# Patient Record
Sex: MALE | Race: ASIAN | Employment: STUDENT | URBAN - METROPOLITAN AREA
[De-identification: names, ages, dates, MRNs, and addresses within clinical notes are randomized per-mention and may not be internally consistent; named-entity substitution may affect disease eponyms.]

---

## 2019-07-12 ENCOUNTER — HOSPITAL ENCOUNTER (EMERGENCY)
Facility: CLINIC | Age: 24
Discharge: HOME | End: 2019-07-12
Attending: EMERGENCY MEDICINE | Admitting: EMERGENCY MEDICINE
Payer: COMMERCIAL

## 2019-07-12 VITALS
OXYGEN SATURATION: 98 % | BODY MASS INDEX: 24.06 KG/M2 | WEIGHT: 153.3 LBS | TEMPERATURE: 98.8 F | DIASTOLIC BLOOD PRESSURE: 82 MMHG | SYSTOLIC BLOOD PRESSURE: 140 MMHG | HEIGHT: 67 IN | RESPIRATION RATE: 16 BRPM

## 2019-07-12 DIAGNOSIS — R07.9 CHEST PAIN, UNSPECIFIED TYPE: ICD-10-CM

## 2019-07-12 LAB — INTERPRETATION ECG - MUSE: NORMAL

## 2019-07-12 PROCEDURE — 99283 EMERGENCY DEPT VISIT LOW MDM: CPT | Mod: 25 | Performed by: EMERGENCY MEDICINE

## 2019-07-12 PROCEDURE — 93005 ELECTROCARDIOGRAM TRACING: CPT | Performed by: EMERGENCY MEDICINE

## 2019-07-12 PROCEDURE — 99283 EMERGENCY DEPT VISIT LOW MDM: CPT | Performed by: EMERGENCY MEDICINE

## 2019-07-12 PROCEDURE — 93010 ELECTROCARDIOGRAM REPORT: CPT | Mod: Z6 | Performed by: EMERGENCY MEDICINE

## 2019-07-12 ASSESSMENT — ENCOUNTER SYMPTOMS
FEVER: 0
WEAKNESS: 0
EYE DISCHARGE: 0
SHORTNESS OF BREATH: 0
BACK PAIN: 0
CONFUSION: 0
SORE THROAT: 0
COUGH: 0
DYSURIA: 0
FLANK PAIN: 0
CHILLS: 0
DIARRHEA: 0
MYALGIAS: 0
VOMITING: 0
HEADACHES: 0
ABDOMINAL PAIN: 0
RHINORRHEA: 0
NAUSEA: 0
BRUISES/BLEEDS EASILY: 0

## 2019-07-12 ASSESSMENT — MIFFLIN-ST. JEOR: SCORE: 1647.86

## 2019-07-12 NOTE — ED NOTES
Pt c/o L chest/shoulder pain intermittently. Pt states sometimes its painful and sometimes it's just tingling. He has not had an episdode today.

## 2019-07-12 NOTE — ED AVS SNAPSHOT
[facility], [place], Emergency Department  [address] [place] [id number]  Phone:  [phone]                                    Shannan Bai   MRN: [id number]    Department:  [facility], Emergency Department   Date of Visit:  7/12/2019           After Visit Summary Signature Page    I have received my discharge instructions, and my questions have been answered. I have discussed any challenges I see with this plan with the nurse or doctor.    ..........................................................................................................................................  Patient/Patient Representative Signature      ..........................................................................................................................................  Patient Representative Print Name and Relationship to Patient    ..................................................               ................................................  Date                                   Time    ..........................................................................................................................................  Reviewed by Signature/Title    ...................................................              ..............................................  Date                                               Time          22EPIC Rev 08/18

## 2019-07-12 NOTE — ED PROVIDER NOTES
History     Chief Complaint   Patient presents with     Chest Wall Pain     HPI  Shannan Bai is a 23 year old male who has no significant past medical history who presents emergency department from home with a complaint of some chest pain.  Patient complains of intermittent chest wall and left shoulder pain over the past few days.  Patient describes the pain is a dull achy pain that he describes as a minimal pain with no radiation, no aggravating, no alleviating factors.  Patient also complains of an occasional tingling or numbness in his left arm.  Patient denies any fever, chills, nausea, vomiting, shortness of breath, cough or cold-like symptoms.  Patient reports that he is extremely healthy, exercises regularly, has no symptoms when he runs, he does report that he has been lifting more and lifting more weights in his upper extremity so thinks the pain may be related to this however he just wanted to make sure it was not his heart.  Patient denies any leg pain, calf tenderness, ankle swelling.  Patient denies any recent immobilization, prolonged car rides, travel, no cancer history, patient denies any tobacco, drug, alcohol abuse (reports he does smoke approximately 5 cigarettes/year)    I have reviewed the Medications, Allergies, Past Medical and Surgical History, and Social History in the Epic system.    Review of Systems   Constitutional: Negative for chills and fever.   HENT: Negative for congestion, rhinorrhea and sore throat.    Eyes: Negative for discharge.   Respiratory: Negative for cough and shortness of breath.    Cardiovascular: Positive for chest pain. Negative for leg swelling.   Gastrointestinal: Negative for abdominal pain, diarrhea, nausea and vomiting.   Endocrine: Negative for polyuria.   Genitourinary: Negative for dysuria and flank pain.   Musculoskeletal: Negative for back pain and myalgias.   Skin: Negative for rash.   Allergic/Immunologic: Negative for immunocompromised state.  "  Neurological: Negative for weakness and headaches.   Hematological: Does not bruise/bleed easily.   Psychiatric/Behavioral: Negative for confusion and suicidal ideas.       Physical Exam   BP: 144/89  Heart Rate: 115  Temp: 98.8  F (37.1  C)  Resp: 18  Height: 170 cm (5' 6.93\")  Weight: 69.5 kg (153 lb 4.8 oz)  SpO2: 100 %      Physical Exam   Constitutional: He is oriented to person, place, and time. He appears well-developed. No distress.   HENT:   Head: Normocephalic and atraumatic.   Right Ear: External ear normal.   Left Ear: External ear normal.   Mouth/Throat: Oropharynx is clear and moist.   Eyes: Pupils are equal, round, and reactive to light. Conjunctivae and EOM are normal.   Neck: Normal range of motion. Neck supple.   Cardiovascular: Normal rate, regular rhythm and normal heart sounds.   Pulmonary/Chest: Effort normal and breath sounds normal. No respiratory distress. He has no wheezes. He has no rales.   Abdominal: Soft. He exhibits no distension. There is no tenderness. There is no rebound and no guarding.   Musculoskeletal: Normal range of motion. He exhibits no tenderness or deformity.   Neurological: He is alert and oriented to person, place, and time. No cranial nerve deficit. Coordination normal.   Skin: Skin is warm and dry. No rash noted.   Psychiatric: He has a normal mood and affect. His behavior is normal.       ED Course        Procedures             EKG Interpretation:      Interpreted by Akua Hawk  Time reviewed: 0024  Symptoms at time of EKG: chest pain   Rhythm: normal sinus rhythm with sinus arrhythmia   Rate: normal  Axis: normal  Ectopy: none  Conduction: normal  ST Segments/ T Waves: No ischemic change, early repolarization  Q Waves: none  Comparison to prior: No old EKG available    Clinical Impression: normal EKG, early repolarization, no acute ischemic change      Labs Ordered and Resulted from Time of ED Arrival Up to the Time of Departure from the ED - No data to " display      Assessments & Plan (with Medical Decision Making)     Shannan Bai is a 23 year old male who has no significant past medical history who presents emergency department from home with a complaint of some chest pain.  Upon arrival patient is well-appearing, afebrile, no distress.  Patient with mild tachycardic initially however was anxious.  Otherwise vital signs within normal limits and hemodynamically stable.  Unremarkable physical examination.  I do not believe patient's complaint is cardiac etiology, discussed with the patient this could be inflammatory related to his increase his weights and exercises.  At this time will obtain an EKG.  Patient is overall extremely low risk for any cardiac, pulmonary, infectious etiology. No exertional symptoms. PERC and Wells negative. No risk factors.     I reviewed EKG which with marked sinus arrhythmia with a ventricular rate of 68 bpm with evidence of early repolarization, no acute ischemic change.  I I discussed results with patient and overall risk factors.  The clinical presentation along with patient risk factors makes him extremely low risk for any cardiac etiology. The History, physical exam, were reviewed with the patient and is not consistent with coronary disease or evolving myocardial infarction.  Patient feels comfortable with discharge home with close monitoring and close follow-up with his primary care physician however return precautions discussed if recurrent or persistent chest pain, shortness of breath, or worsening symptoms. Patient understands and agrees with plan to just return precautions discussed.    I have reviewed the nursing notes.    I have reviewed the findings, diagnosis, plan and need for follow up with the patient.       Medication List      There are no discharge medications for this visit.         Final diagnoses:   Chest pain, unspecified type       7/12/2019   [facility], [place], EMERGENCY DEPARTMENT     Akua Hawk,  MD  07/12/19 0230

## 2019-07-12 NOTE — DISCHARGE INSTRUCTIONS
Thank you for your patience today.  Please follow-up with your regular doctor in the next 2-3 days for further evaluation and follow-up care.  Please call to schedule an appointment.  Please continue your own medications.  Please return to the ER if you develop worsening or persistent chest pain, shortness of breath, weakness, or any worsening of your current symptoms.  It was a pleasure taking care of you today.  We hope you feel better soon.

## 2020-02-07 ENCOUNTER — MEDICAL CORRESPONDENCE (OUTPATIENT)
Dept: HEALTH INFORMATION MANAGEMENT | Facility: CLINIC | Age: 25
End: 2020-02-07

## 2020-02-07 ENCOUNTER — TRANSFERRED RECORDS (OUTPATIENT)
Dept: HEALTH INFORMATION MANAGEMENT | Facility: CLINIC | Age: 25
End: 2020-02-07

## 2020-02-07 NOTE — PROGRESS NOTES
[facility] Neurology Consultation    Shannan Bai MRN# [id number]   Age: 24 year old YOB: 1995     Requesting physician: Yury Tinajero  [facility] [place]     Reason for Consultation: atypical motions of the right side mouth, right 4th and 5th digits, and right foot    History of Presenting Symptoms:  Shannan Bai is a 24 year old male who presents today for evaluation of right side mouth, right 4th and 5th digits, and right foot.    The patient reports that in 2017, (6-12 times) he gets coiling (finger flexion) up of his 4th, 5th digits on his right hand.  He also gets some weakness in his right leg (leading to weakness with walking).  He has never fallen when he reports the weakness.  He also has an issue with his right side of the mouth where sometimes it feels like it may be drooping or being pulled toward his ear. All the symptoms can occur independently all together.  There is no way to reproduce them on command.  They last 10-15 seconds, and he cant control them when they are preent.  They are not painful and do not feel like a cramp.  He doesn't know if they occur in sleep.  There is no visual change, headache, nausea/vomiting, head turning.  There is no choking, no speech difficulties.  He is not reporting confusion prior to or after the events, having an atypical smell or movement or tic in language prior, and no rising sensation or nausea during.    No brain trauma, no childhood infections.  No odd exposures to chemicals, no increased stress.  He was doing his undergrad in 2018, and he is here for graduate studies.     The patient had a large amount of tests done while home in [facility] out of concern for episodes of slight weakness of the right leg, right arm and right facial droop.  The patient was seen at [facility] for a clinic visit on 2/10/2020 and indicated that these episodes would occur every 2-3 months with resolution on their own.  He mentioned being very concerned about  pseudostrokes.      Past Medical History:   Mitral regurgtation     Past Surgical History:     Past Surgical History:   Procedure Laterality Date     COLONOSCOPY          Social History:   Smokes for 2 months, but stopped. Drinks socially. No illicit drugs. 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   Tobacco Use     Smoking status: Never Smoker     Smokeless tobacco: Never Used   Substance Use Topics     Alcohol use: Yes     Comment: 5 drinks every month     Drug use: Never        Family History:   No major family history.     Medications:   No current medications     Allergies:   Allergies no known allergies     Review of Systems:   A comprehensive 10 point review of systems (constitutional, ENT, cardiac, peripheral vascular, lymphatic, respiratory, GI, , Musculoskeletal, skin, Neurological) was performed and found to be negative except as described in this note.      Physical Exam:   Vitals: /68 (BP Location: Right arm, Patient Position: Sitting)   Pulse 62   Wt 62.6 kg (138 lb)   SpO2 99%   BMI 21.66 kg/m    General: Seated comfortably in no acute distress.  HEENT: Neck supple with normal range of motion. No paracervical muscle tenderness or tightness.  Optic discs sharp and vasculature normal on funduscopic exam.   Heart: Regular rate  Lungs: breathing comfortably  GI: Non tender  Extremities: no edema  Skin: No rashes  Neurologic:     Mental Status: Fully alert, attentive and oriented. Speech clear and fluent, no paraphasic errors.      Cranial Nerves: Visual fields intact. PERRL. EOMI with normal smooth pursuit. Facial sensation intact/symmetric. Facial movements symmetric. Hearing not formally tested but intact to conversation. Palate elevation symmetric, uvula midline. No dysarthria. Shoulder shrug strong bilaterally. Tongue protrusion midline.     Motor: No tremors or other abnormal movements observed. Muscle tone normal throughout. No pronator drift.  Normal/symmetric rapid finger tapping. Strength 5/5 throughout upper and lower extremities.     Deep Tendon Reflexes: 2+/symmetric throughout upper and lower extremities. No clonus. Toes downgoing bilaterally.     Sensory: Intact/symmetric to light touch, pinprick, temperature, vibration and proprioception throughout upper and lower extremities. Negative Romberg.      Coordination: Finger-nose-finger and heel-shin intact without dysmetria. Rapid alternating movements intact/symmetric with normal speed and rhythm.     Gait: Normal, steady casual gait. Able to walk on toes, heels and tandem without difficulty.         Assessment and Plan:   Assessment:  - Involuntary contractions of hand, face, and foot    The patient has an entirely normal exam, but his history of symptoms, onset of symptoms, and location of symptoms could represent a seizure or some lesion leading to seizure that would be found on the primary motor cortex or basal ganglia.  There may be autoimmune causes, but as to why there would only be one pattern of onset that is stereotypical and positive in symptomatolgy.  There is also a possibility of a dystonia syndrome (dopa-responsive), but at this young of an age, without pain, and without any progression of symptoms over 2 years, I will r/out more severe conditions before moving the w/up in this direction.     Plan:  - EEG ( 3 hour, sleep deprived )  - Mri brain w/wout contrast      Follow up in Neurology clinic as needed should new concerns arise.    PHYLICIA Pascal D.O.   of Neurology      Greater than 50% of the total time (40 min) in this patient encounter was spent on counseling and/or coordination of care. We reviewed diagnostic results, impressions, and discussed other possible tests if symptoms do not improve. We discussed the implications of the diagnosis, as well as risks and benefits of management options. We reviewed treatment instructions and our scheduled follow-up as  specified in the discharge plan. We also discussed the importance of compliance with the chosen course of treatment. The patient is in agreement with this plan and has no further questions.

## 2020-02-09 NOTE — TELEPHONE ENCOUNTER
RECORDS RECEIVED FROM: External   Date of Appt: 2.10.2020   NOTES (FOR ALL VISITS) STATUS DETAILS   OFFICE NOTE from referring provider In process    OFFICE NOTE from other specialist N/A    DISCHARGE SUMMARY from hospital N/A    DISCHARGE REPORT from the ER N/A    OPERATIVE REPORT N/A    MEDICATION LIST N/A    IMAGING  (FOR ALL VISITS)     EMG N/A    EEG N/A    MRI (HEAD, NECK, SPINE) N/A    LUMBAR PUNCTURE N/A    LIV Scan N/A    CT (HEAD, NECK, SPINE) N/A       Action 2.9.2020 MJ 1:31 PM   Action Taken Send urgent request to [facility] for all related med recs and imaging.     2/10/2020-Spoke with [facility], Will fax records ASAP-MR @ 626AM   Records from [facility] received and scanned to [facility]-MR @ 910AM

## 2020-02-10 ENCOUNTER — OFFICE VISIT (OUTPATIENT)
Dept: NEUROLOGY | Facility: CLINIC | Age: 25
End: 2020-02-10
Payer: COMMERCIAL

## 2020-02-10 ENCOUNTER — PRE VISIT (OUTPATIENT)
Dept: NEUROLOGY | Facility: CLINIC | Age: 25
End: 2020-02-10

## 2020-02-10 VITALS
WEIGHT: 138 LBS | DIASTOLIC BLOOD PRESSURE: 68 MMHG | BODY MASS INDEX: 21.66 KG/M2 | HEART RATE: 62 BPM | SYSTOLIC BLOOD PRESSURE: 139 MMHG | OXYGEN SATURATION: 99 %

## 2020-02-10 DIAGNOSIS — R25.3 MUSCLE TWITCHING: ICD-10-CM

## 2020-02-10 DIAGNOSIS — R25.9 ABNORMAL INVOLUNTARY MOVEMENT: Primary | ICD-10-CM

## 2020-02-10 SDOH — HEALTH STABILITY: MENTAL HEALTH: HOW OFTEN DO YOU HAVE A DRINK CONTAINING ALCOHOL?: 2-4 TIMES A MONTH

## 2020-02-10 ASSESSMENT — ENCOUNTER SYMPTOMS
HEADACHES: 0
DIZZINESS: 0
SPEECH CHANGE: 0
DISTURBANCES IN COORDINATION: 1
PARALYSIS: 0
LOSS OF CONSCIOUSNESS: 0
TINGLING: 0
TREMORS: 0
MEMORY LOSS: 0
NUMBNESS: 0
SEIZURES: 0
WEAKNESS: 1

## 2020-02-10 ASSESSMENT — PAIN SCALES - GENERAL: PAINLEVEL: NO PAIN (0)

## 2020-02-10 NOTE — LETTER
2/10/2020       RE: Shannan Bai  [address]  [facility] [address]     Dear Colleague,    Thank you for referring your patient, Shannan Bai, to the [facility] NEUROLOGY at [facility]. Please see a copy of my visit note below.    [facility] Neurology Consultation    Shannan Bai MRN# [id number]   Age: 24 year old YOB: 1995     Requesting physician: Yury Tinajero  [facility] [place]     Reason for Consultation: atypical motions of the right side mouth, right 4th and 5th digits, and right foot    History of Presenting Symptoms:  Shannan Bai is a 24 year old male who presents today for evaluation of right side mouth, right 4th and 5th digits, and right foot.    The patient reports that in 2017, (6-12 times) he gets coiling (finger flexion) up of his 4th, 5th digits on his right hand.  He also gets some weakness in his right leg (leading to weakness with walking).  He has never fallen when he reports the weakness.  He also has an issue with his right side of the mouth where sometimes it feels like it may be drooping or being pulled toward his ear. All the symptoms can occur independently all together.  There is no way to reproduce them on command.  They last 10-15 seconds, and he cant control them when they are preent.  They are not painful and do not feel like a cramp.  He doesn't know if they occur in sleep.  There is no visual change, headache, nausea/vomiting, head turning.  There is no choking, no speech difficulties.  He is not reporting confusion prior to or after the events, having an atypical smell or movement or tic in language prior, and no rising sensation or nausea during.    No brain trauma, no childhood infections.  No odd exposures to chemicals, no increased stress.  He was doing his undergrad in 2018, and he is here for graduate studies.     The patient had a large amount of tests done while home in [facility] out of concern  for episodes of slight weakness of the right leg, right arm and right facial droop.  The patient was seen at [facility] for a clinic visit on 2/10/2020 and indicated that these episodes would occur every 2-3 months with resolution on their own.  He mentioned being very concerned about pseudostrokes.      Past Medical History:   Mitral regurgtation     Past Surgical History:     Past Surgical History:   Procedure Laterality Date     COLONOSCOPY          Social History:   Smokes for 2 months, but stopped. Drinks socially. No illicit drugs. 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   Tobacco Use     Smoking status: Never Smoker     Smokeless tobacco: Never Used   Substance Use Topics     Alcohol use: Yes     Comment: 5 drinks every month     Drug use: Never        Family History:   No major family history.     Medications:   No current medications     Allergies:   Allergies no known allergies     Review of Systems:   A comprehensive 10 point review of systems (constitutional, ENT, cardiac, peripheral vascular, lymphatic, respiratory, GI, , Musculoskeletal, skin, Neurological) was performed and found to be negative except as described in this note.      Physical Exam:   Vitals: /68 (BP Location: Right arm, Patient Position: Sitting)   Pulse 62   Wt 62.6 kg (138 lb)   SpO2 99%   BMI 21.66 kg/m     General: Seated comfortably in no acute distress.  HEENT: Neck supple with normal range of motion. No paracervical muscle tenderness or tightness.  Optic discs sharp and vasculature normal on funduscopic exam.   Heart: Regular rate  Lungs: breathing comfortably  GI: Non tender  Extremities: no edema  Skin: No rashes  Neurologic:     Mental Status: Fully alert, attentive and oriented. Speech clear and fluent, no paraphasic errors.      Cranial Nerves: Visual fields intact. PERRL. EOMI with normal smooth pursuit. Facial sensation intact/symmetric. Facial movements symmetric.  Hearing not formally tested but intact to conversation. Palate elevation symmetric, uvula midline. No dysarthria. Shoulder shrug strong bilaterally. Tongue protrusion midline.     Motor: No tremors or other abnormal movements observed. Muscle tone normal throughout. No pronator drift. Normal/symmetric rapid finger tapping. Strength 5/5 throughout upper and lower extremities.     Deep Tendon Reflexes: 2+/symmetric throughout upper and lower extremities. No clonus. Toes downgoing bilaterally.     Sensory: Intact/symmetric to light touch, pinprick, temperature, vibration and proprioception throughout upper and lower extremities. Negative Romberg.      Coordination: Finger-nose-finger and heel-shin intact without dysmetria. Rapid alternating movements intact/symmetric with normal speed and rhythm.     Gait: Normal, steady casual gait. Able to walk on toes, heels and tandem without difficulty.         Assessment and Plan:   Assessment:  - Involuntary contractions of hand, face, and foot    The patient has an entirely normal exam, but his history of symptoms, onset of symptoms, and location of symptoms could represent a seizure or some lesion leading to seizure that would be found on the primary motor cortex or basal ganglia.  There may be autoimmune causes, but as to why there would only be one pattern of onset that is stereotypical and positive in symptomatolgy.  There is also a possibility of a dystonia syndrome (dopa-responsive), but at this young of an age, without pain, and without any progression of symptoms over 2 years, I will r/out more severe conditions before moving the w/up in this direction.     Plan:  - EEG ( 3 hour, sleep deprived )  - Mri brain w/wout contrast      Follow up in Neurology clinic as needed should new concerns arise.    PHYLICIA Pascal D.O.   of Neurology    Greater than 50% of the total time (40 min) in this patient encounter was spent on counseling and/or coordination  of care. We reviewed diagnostic results, impressions, and discussed other possible tests if symptoms do not improve. We discussed the implications of the diagnosis, as well as risks and benefits of management options. We reviewed treatment instructions and our scheduled follow-up as specified in the discharge plan. We also discussed the importance of compliance with the chosen course of treatment. The patient is in agreement with this plan and has no further questions.

## 2020-02-10 NOTE — NURSING NOTE
Chief Complaint   Patient presents with     Consult     [facility] NEW - NUMBNESS/TINGLING      Sandy Jessica, EMT

## 2020-02-10 NOTE — PATIENT INSTRUCTIONS
We will look at your brain with both Imaging and an EEG.  These will find any mass or a possible seizure focus should there be one present.     - EEG  - MRI brain w/wout contrast

## 2020-02-14 ENCOUNTER — ANCILLARY PROCEDURE (OUTPATIENT)
Dept: MRI IMAGING | Facility: CLINIC | Age: 25
End: 2020-02-14
Attending: PSYCHIATRY & NEUROLOGY
Payer: COMMERCIAL

## 2020-02-14 DIAGNOSIS — R25.3 MUSCLE TWITCHING: ICD-10-CM

## 2020-02-14 DIAGNOSIS — R25.9 ABNORMAL INVOLUNTARY MOVEMENT: ICD-10-CM

## 2020-02-14 RX ORDER — GADOBUTROL 604.72 MG/ML
7.5 INJECTION INTRAVENOUS ONCE
Status: COMPLETED | OUTPATIENT
Start: 2020-02-14 | End: 2020-02-14

## 2020-02-14 RX ADMIN — GADOBUTROL 6 ML: 604.72 INJECTION INTRAVENOUS at 19:41

## 2020-02-21 ENCOUNTER — TELEPHONE (OUTPATIENT)
Dept: NEUROLOGY | Facility: CLINIC | Age: 25
End: 2020-02-21

## 2020-03-11 ENCOUNTER — HEALTH MAINTENANCE LETTER (OUTPATIENT)
Age: 25
End: 2020-03-11

## 2020-11-16 ENCOUNTER — TRANSFERRED RECORDS (OUTPATIENT)
Dept: HEALTH INFORMATION MANAGEMENT | Facility: CLINIC | Age: 25
End: 2020-11-16

## 2020-11-16 ENCOUNTER — MEDICAL CORRESPONDENCE (OUTPATIENT)
Dept: HEALTH INFORMATION MANAGEMENT | Facility: CLINIC | Age: 25
End: 2020-11-16

## 2020-11-20 ENCOUNTER — ANCILLARY PROCEDURE (OUTPATIENT)
Dept: CARDIOLOGY | Facility: CLINIC | Age: 25
End: 2020-11-20
Attending: FAMILY MEDICINE
Payer: COMMERCIAL

## 2020-11-20 DIAGNOSIS — R00.2 PALPITATIONS: ICD-10-CM

## 2020-11-20 PROCEDURE — 0296T LEADLESS EKG MONITOR 3 TO 14 DAYS: CPT

## 2020-11-20 PROCEDURE — 0298T LEADLESS EKG MONITOR 3 TO 14 DAYS: CPT | Performed by: INTERNAL MEDICINE

## 2020-11-20 NOTE — PROGRESS NOTES
Per Dr. Juan F Escalante, patient to have 14 day Zio monitor placed.  Diagnosis: Palpitations  Monitor placed: Yes  Patient Instructed: Yes  Patient verbalized understanding: Yes  Holter # [id number]  Placed : IG  Documented: IG

## 2021-01-03 ENCOUNTER — HEALTH MAINTENANCE LETTER (OUTPATIENT)
Age: 26
End: 2021-01-03

## 2021-04-25 ENCOUNTER — HEALTH MAINTENANCE LETTER (OUTPATIENT)
Age: 26
End: 2021-04-25

## 2021-10-10 ENCOUNTER — HEALTH MAINTENANCE LETTER (OUTPATIENT)
Age: 26
End: 2021-10-10

## 2022-05-21 ENCOUNTER — HEALTH MAINTENANCE LETTER (OUTPATIENT)
Age: 27
End: 2022-05-21

## 2022-09-18 ENCOUNTER — HEALTH MAINTENANCE LETTER (OUTPATIENT)
Age: 27
End: 2022-09-18

## 2023-06-04 ENCOUNTER — HEALTH MAINTENANCE LETTER (OUTPATIENT)
Age: 28
End: 2023-06-04